# Patient Record
Sex: FEMALE | Race: WHITE | NOT HISPANIC OR LATINO | Employment: UNEMPLOYED | URBAN - NONMETROPOLITAN AREA
[De-identification: names, ages, dates, MRNs, and addresses within clinical notes are randomized per-mention and may not be internally consistent; named-entity substitution may affect disease eponyms.]

---

## 2017-06-06 ENCOUNTER — TRANSCRIBE ORDERS (OUTPATIENT)
Dept: ULTRASOUND IMAGING | Facility: HOSPITAL | Age: 47
End: 2017-06-06

## 2017-06-06 ENCOUNTER — TRANSCRIBE ORDERS (OUTPATIENT)
Dept: MAMMOGRAPHY | Facility: HOSPITAL | Age: 47
End: 2017-06-06

## 2017-06-06 ENCOUNTER — TRANSCRIBE ORDERS (OUTPATIENT)
Dept: MRI IMAGING | Facility: HOSPITAL | Age: 47
End: 2017-06-06

## 2017-06-06 DIAGNOSIS — M79.601 RIGHT ARM PAIN: Primary | ICD-10-CM

## 2017-06-06 DIAGNOSIS — M50.30 DEGENERATION OF CERVICAL INTERVERTEBRAL DISC: Primary | ICD-10-CM

## 2017-06-06 DIAGNOSIS — Z13.9 SCREENING: Primary | ICD-10-CM

## 2017-06-07 ENCOUNTER — HOSPITAL ENCOUNTER (OUTPATIENT)
Dept: GENERAL RADIOLOGY | Facility: HOSPITAL | Age: 47
Discharge: HOME | End: 2017-06-07

## 2017-06-07 ENCOUNTER — HOSPITAL ENCOUNTER (OUTPATIENT)
Dept: GENERAL RADIOLOGY | Facility: HOSPITAL | Age: 47
Discharge: HOME | End: 2017-06-07
Admitting: FAMILY MEDICINE

## 2017-06-07 ENCOUNTER — TRANSCRIBE ORDERS (OUTPATIENT)
Dept: ADMINISTRATIVE | Facility: HOSPITAL | Age: 47
End: 2017-06-07

## 2017-06-07 DIAGNOSIS — M54.2 NECK PAIN: Primary | ICD-10-CM

## 2017-06-07 DIAGNOSIS — M25.521 RIGHT ELBOW PAIN: ICD-10-CM

## 2017-06-07 PROCEDURE — 72050 X-RAY EXAM NECK SPINE 4/5VWS: CPT

## 2017-06-07 PROCEDURE — 73080 X-RAY EXAM OF ELBOW: CPT

## 2017-07-28 ENCOUNTER — TRANSCRIBE ORDERS (OUTPATIENT)
Dept: MAMMOGRAPHY | Facility: HOSPITAL | Age: 47
End: 2017-07-28

## 2017-08-24 ENCOUNTER — TRANSCRIBE ORDERS (OUTPATIENT)
Dept: MAMMOGRAPHY | Facility: HOSPITAL | Age: 47
End: 2017-08-24

## 2017-08-24 DIAGNOSIS — Z12.31 SCREENING MAMMOGRAM, ENCOUNTER FOR: Primary | ICD-10-CM

## 2017-10-04 ENCOUNTER — CONSULT (OUTPATIENT)
Dept: ONCOLOGY | Facility: CLINIC | Age: 47
End: 2017-10-04

## 2017-10-04 VITALS
DIASTOLIC BLOOD PRESSURE: 100 MMHG | WEIGHT: 293 LBS | BODY MASS INDEX: 43.4 KG/M2 | OXYGEN SATURATION: 98 % | HEIGHT: 69 IN | HEART RATE: 94 BPM | TEMPERATURE: 97.2 F | SYSTOLIC BLOOD PRESSURE: 148 MMHG

## 2017-10-04 DIAGNOSIS — Z86.711 HX PULMONARY EMBOLISM: Primary | ICD-10-CM

## 2017-10-04 PROCEDURE — 99204 OFFICE O/P NEW MOD 45 MIN: CPT | Performed by: INTERNAL MEDICINE

## 2017-10-04 RX ORDER — LISINOPRIL AND HYDROCHLOROTHIAZIDE 25; 20 MG/1; MG/1
1 TABLET ORAL DAILY
COMMUNITY
End: 2019-05-22 | Stop reason: HOSPADM

## 2017-10-04 RX ORDER — VENLAFAXINE HYDROCHLORIDE 150 MG/1
150 CAPSULE, EXTENDED RELEASE ORAL DAILY
COMMUNITY
End: 2021-10-28

## 2017-10-04 RX ORDER — FLUTICASONE PROPIONATE 50 MCG
2 SPRAY, SUSPENSION (ML) NASAL DAILY
COMMUNITY

## 2017-10-04 NOTE — PROGRESS NOTES
DATE OF CONSULTATION: 10/4/2017    REFERRING PHYSICIAN: Fortunato Hunter DO    Dear Dr. Fortunato Hunter DO  Thank you for asking for my medical advice on this patient. I saw her in the  [place] office on 10/4/2017    REASON FOR CONSULTATION: History of bilateral massive pulmonary embolisms     HISTORY OF PRESENT ILLNESS: The patient is a very pleasant 47 y.o.  female   With past medical history significant for metastatic bilateral massive PEs as well as right below knee DVT diagnosed June 2014.  Patient blood clots were unprovoked.  She was treated with thrombolysis followed by 1 year full anticoagulation.  Patient had changed her primary care provider.  She was referred to me for further recommendations.    SUBJECTIVE: When I saw the patient today she's been very anxious about her new diagnosis of lung metastatic breast cancer.  Patient herself had history of uterine cancer treated with hysterectomy as well as profound.  She were treated with resection.  Patient denied any bleeding denied any skin bruises.  She has been anxious about having your blood clots.    Review of Systems   Constitutional: Negative for activity change, appetite change, chills, fatigue, fever and unexpected weight change.   HENT: Negative for hearing loss, mouth sores, nosebleeds, sore throat and trouble swallowing.    Eyes: Negative for visual disturbance.   Respiratory: Negative for cough, chest tightness, shortness of breath and wheezing.    Cardiovascular: Negative for chest pain, palpitations and leg swelling.   Gastrointestinal: Negative for abdominal distention, abdominal pain, blood in stool, constipation, diarrhea, nausea, rectal pain and vomiting.   Endocrine: Negative for cold intolerance and heat intolerance.   Genitourinary: Negative for difficulty urinating, dysuria, frequency and urgency.   Musculoskeletal: Negative for arthralgias, back pain, gait problem, joint swelling and myalgias.   Skin: Negative for  rash.   Neurological: Negative for dizziness, tremors, syncope, weakness, light-headedness, numbness and headaches.   Hematological: Negative for adenopathy. Does not bruise/bleed easily.   Psychiatric/Behavioral: Negative for confusion, sleep disturbance and suicidal ideas. The patient is not nervous/anxious.        Past Medical History:   Diagnosis Date   • Anxiety    • Depression    • DVT (deep vein thrombosis) in pregnancy    • HTN (hypertension)    • Parathyroid cancer    • Stroke    • Uterine cancer        Social History     Social History   • Marital status:      Spouse name: N/A   • Number of children: N/A   • Years of education: N/A     Occupational History   • Not on file.     Social History Main Topics   • Smoking status: Former Smoker   • Smokeless tobacco: Never Used   • Alcohol use Yes   • Drug use: No   • Sexual activity: Not on file     Other Topics Concern   • Not on file     Social History Narrative   • No narrative on file       No family history on file.    Past Surgical History:   Procedure Laterality Date   •  SECTION     • PARATHYROIDECTOMY         Allergies not on file     No current outpatient prescriptions on file.    PHYSICAL EXAMINATION:   There were no vitals taken for this visit.   ECOG Performance Status: 0 - Asymptomatic  General Appearance:  alert, cooperative, no apparent distress and appears stated age   Neurologic/Psychiatric: A&O x 3, gait steady, appropriate affect, strength 5/5 in all muscle groups   HEENT:  Normocephalic, without obvious abnormality, mucous membranes moist   Neck: Supple, symmetrical, trachea midline, no adenopathy;  No thyromegaly, masses, or tenderness   Lungs:   Clear to auscultation bilaterally; respirations regular, even, and unlabored bilaterally   Heart:  Regular rate and rhythm, no murmurs appreciated   Abdomen:   Soft, non-tender, non-distended and no organomegaly   Lymph nodes: No cervical, supraclavicular, inguinal or axillary  adenopathy noted   Extremities: Normal, atraumatic; no clubbing, cyanosis, or edema    Skin: No rashes, ulcers, or suspicious lesions noted       No visits with results within 2 Week(s) from this visit.  Latest known visit with results is:    Hospital Outpatient Visit on 06/20/2014   Component Date Value Ref Range Status   • Antinuclear Antibody, IFA 06/20/2014 Negative  . Final    Comment:                                      Negative   <1:80                                         Borderline  1:80                                         Positive   >1:80    Performed at:  [address]  [id number]    : Govind Jonas MD, Phone:  [phone]       • Factor II, DNA Analysis 06/20/2014 Comment  . Final    Comment: NEGATIVE    No mutation identified.    Comment:    A point mutation ([id number]) in the factor II (prothrombin)    gene is the second most common cause of inherited    thrombophilia. The incidence of this mutation in the [place]     population is about 2% and in the     American population it is approximately 0.5%. This mutation    is rare in the  and  population. Being    heterozygous for a prothrombin mutation increases the risk    for developing venous thrombosis about 2 to 3 times above    the general population risk. Being homozygous for the    prothrombin gene mutation increases the relative risk for    venous thrombosis further, although it is not yet known how    much further the risk is increased. In women heterozygous    for the prothrombin gene mutation, the use of estrogen    containing oral contraceptives increases the relative risk    of venous thrombosis about 16 times and the risk of    developing cerebral thrombosis is also significantly                               increased. In pregnancy the prothrombin gene mutation    increases risk for venous thrombosis and may increase    risk for stillbirth, placental  abruption, pre-eclampsia and    fetal growth restriction. If the patient possesses two or    more congenital or acquired thrombophilic risk factors, the    risk for thrombosis may rise to more than the sum of the    risk ratios for the individual mutations. This assay    detects only the prothrombin U06388J mutation and does    not measure genetic abnormalities elsewhere in the    genome. Other thrombotic risk factors may be pursued    through systematic clinical laboratory analysis. These    factors include the R506Q (Leiden) mutation in the Factor V    gene, plasma homocysteine levels, as well as testing for    deficiencies of antithrombin III, protein C and protein S.       • Additional Information 06/20/2014 Comment  . Final    Comment: Genetic Counselors are available for health care providers    to discuss results at [phone]    Methodology:    DNA analysis of the Factor II gene was performed by PCR    amplification followed by restriction analysis. The    diagnostic sensitivity is >99% for both. All the tests must    be combined with clinical information for the most accurate    interpretation. Molecular-based testing is highly accurate,    but as in any laboratory test, diagnostic errors may occur.    Poort SR, et al. Blood. 1996; 88:7995-7313.    Hattie EA. Circulation. 2004; 110:e15-e18.    Karl I, et al. Arterioscler Thromb Vasc Biol. 1999;    19:700-703.    Erick Martell, PhD    Sherrie Gusman, PhD    Aleida Frazier, PhD    Corin Mao, PhD    Kathy Meadows, PhD    MD Pita Souza MD, PhD    BRANDI Rodriguez, PhD    Performed at:  [facility]    [address] [place], [place]  [id number]    : Maria T Jackson MD, Phone:  [phone]                                  • Factor V Leiden 06/20/2014 Comment  . Final    Comment: Result:  Negative (no mutation found)    Factor V Leiden is a specific mutation (R506Q) in the factor    V  gene that is associated with an increased risk of venous    thrombosis. Factor V Leiden is more resistant to    inactivation by activated protein C.  As a result, factor V    persists in the circulation leading to a mild hyper-    coagulable state.  The Leiden mutation accounts for 90% -    95% of APC resistance.  Factor V Leiden has been reported in    patients with deep vein thrombosis, pulmonary embolus,    central retinal vein occlusion, cerebral sinus thrombosis    and hepatic vein thrombosis. Other risk factors to be    considered in the workup for venous thrombosis include the    [id number] mutation in the factor II (prothrombin) gene,    protein S and C deficiency, and antithrombin deficiencies.    Anticardiolipin antibody and lupus anticoagulant analysis    may be appropriate for certain patients, as well as    homocysteine levels.    Contact your local [facility] for informati                           on on how to order    additional testing if desired.       • C-Reactive Protein 06/20/2014 2.4* 0.0 - 1.0 mg/dL Final   • Dilute Prothrombin Time(dPT) 06/20/2014 74.0* 0.0 - 55.0 sec Final   • dPT Confirm Ratio 06/20/2014 1.06  0.00 - 1.20 Ratio Final   • Thrombin Time 06/20/2014 14.7  0.0 - 20.0 sec Final   • PTT Lupus Anticoagulant 06/20/2014 59.6* 0.0 - 50.0 sec Final   • PTT LA MIX 06/20/2014 38.4  0.0 - 50.0 sec Final   • Incubated PTT LA Mix 06/20/2014 40.7  0.0 - 50.0 sec Final   • Dilute Viper Venom Time 06/20/2014 47.9  0.0 - 55.1 sec Final   • Interpretation 06/20/2014 Comment:  . Final    Comment: The dPT is markedly extended, as can be seen in patients    receiving oral anticoagulant therapy (OAT). In general, OAT can    reduce the accuracy of lupus anticoagulant testing and results    obtained for patients recieving OAT should be interpreted with    caution.  No lupus anticoagulant was detected. PTT-LA results are    consistent with a deficiency of one or more intrinsic pathway    factors.  Since the  dRVVT was within normal limits, the factors in    question are VIII, IX, XI, XII and the contact factors.  It should be    noted that mixing studies performed on samples with minimally    extended PTT-LA results can be equivocal.  Normal plasma can    overcome weak inhibitors, also resulting in a correction of the    mixing study. The dPT was extended but the dPT confirmatory ratio    was normal. This is consistent with a deficiency or specific    inhibition of one or more extrinsic pathway factors (VII, X, V, II or    fibrinogen).    Performed at:  [address]  [id number]    : Erasmo Yee MD, Phone:  [phone]            No results found.      DIAGNOSTIC DATA:     1. Radiology:    PROCEDURE: XR ELBOW 3+ VW RIGHT-      History: M25.521; M25.521-Pain in right elbow      COMPARISON: None.      FINDINGS:  A 3 view exam demonstrates no acute fracture or dislocation.  The joint spaces are preserved. No soft tissue abnormality is seen.          IMPRESSION:  No acute fracture.     2. Dr. Moeller's note reviewed by me and documented in the  chart.     3. Pathology report: None    4. Laboratory data: As above    ASSESSMENT: The patient is a very pleasant 47 y.o.  female  with history of PE    PROBLEM LIST:   1.  History of unprovoked bilateral massive PEs with right below knee DVT June 2014  2.  Treated with thrombolysis followed by one year of anticoagulation initially with Coumadin followed by Eliquis.  3.  History of uterine cancer treated with hysterectomy  4.  History of parathyroid tumor treated with resection  5.  Morbid obesity  6.  Family history of breast cancer     PLAN:   1. I had a long discussion today with the patient and her  about her diagnosis of PEs.  I reviewed the patient's documents including refereing provider's notes, lab results, and imaging report.   2.  I explained to the patient since she had  bilateral massive unprovoked PEs she would benefit from lifelong anticoagulation despite the negative thrombophilia workup.  I truly believe the benefit of preventing another massive blood clot is higher than the lower risk of bleeding.  3.  Since patient has been treated adequately with full anticoagulation, at this point I will start her on Eliquis 2.5 mg by mouth twice a day.  Patient will stay on this lifelong.  4.  I would recommend to hold Eliquis 2 days prior surgical procedure and resume it as soon as she is been cleared by the surgeon.  5.  Patient was advised to exercise and lose weight. I explained to the patient that obesity is a major risk factor for venous thrombotic events.  6.  I emphasized the importance of keeping up with annual mammograms given her family history of breast cancer.  7.  The patient was see me in the future on an as-needed basis.  Zhanna Lyle MD  10/4/2017

## 2018-05-04 ENCOUNTER — TRANSCRIBE ORDERS (OUTPATIENT)
Dept: ADMINISTRATIVE | Facility: HOSPITAL | Age: 48
End: 2018-05-04

## 2018-05-04 DIAGNOSIS — M50.20 PROLAPSED CERVICAL INTERVERTEBRAL DISC: Primary | ICD-10-CM

## 2018-06-19 RX ORDER — APIXABAN 2.5 MG/1
TABLET, FILM COATED ORAL
Qty: 60 TABLET | Refills: 0 | OUTPATIENT
Start: 2018-06-19

## 2019-04-01 ENCOUNTER — TRANSCRIBE ORDERS (OUTPATIENT)
Dept: ADMINISTRATIVE | Facility: HOSPITAL | Age: 49
End: 2019-04-01

## 2019-04-01 DIAGNOSIS — Z12.39 SCREENING BREAST EXAMINATION: Primary | ICD-10-CM

## 2019-04-10 ENCOUNTER — TRANSCRIBE ORDERS (OUTPATIENT)
Dept: ADMINISTRATIVE | Facility: HOSPITAL | Age: 49
End: 2019-04-10

## 2019-04-10 DIAGNOSIS — R51.9 NONINTRACTABLE HEADACHE, UNSPECIFIED CHRONICITY PATTERN, UNSPECIFIED HEADACHE TYPE: Primary | ICD-10-CM

## 2019-04-12 ENCOUNTER — HOSPITAL ENCOUNTER (OUTPATIENT)
Dept: GENERAL RADIOLOGY | Facility: HOSPITAL | Age: 49
Discharge: HOME | End: 2019-04-12

## 2019-04-12 ENCOUNTER — TRANSCRIBE ORDERS (OUTPATIENT)
Dept: GENERAL RADIOLOGY | Facility: HOSPITAL | Age: 49
End: 2019-04-12

## 2019-04-12 ENCOUNTER — HOSPITAL ENCOUNTER (OUTPATIENT)
Dept: GENERAL RADIOLOGY | Facility: HOSPITAL | Age: 49
Discharge: HOME | End: 2019-04-12
Admitting: FAMILY MEDICINE

## 2019-04-12 DIAGNOSIS — M25.512 BILATERAL SHOULDER PAIN, UNSPECIFIED CHRONICITY: ICD-10-CM

## 2019-04-12 DIAGNOSIS — G89.29 BILATERAL CHRONIC KNEE PAIN: ICD-10-CM

## 2019-04-12 DIAGNOSIS — M79.642 BILATERAL HAND PAIN: ICD-10-CM

## 2019-04-12 DIAGNOSIS — M25.561 BILATERAL CHRONIC KNEE PAIN: ICD-10-CM

## 2019-04-12 DIAGNOSIS — M54.6 THORACIC BACK PAIN, UNSPECIFIED BACK PAIN LATERALITY, UNSPECIFIED CHRONICITY: ICD-10-CM

## 2019-04-12 DIAGNOSIS — M25.551 BILATERAL HIP PAIN: ICD-10-CM

## 2019-04-12 DIAGNOSIS — M54.6 THORACIC SPINE PAIN: ICD-10-CM

## 2019-04-12 DIAGNOSIS — M25.562 BILATERAL CHRONIC KNEE PAIN: ICD-10-CM

## 2019-04-12 DIAGNOSIS — M54.50 LOW BACK PAIN WITHOUT SCIATICA, UNSPECIFIED BACK PAIN LATERALITY, UNSPECIFIED CHRONICITY: ICD-10-CM

## 2019-04-12 DIAGNOSIS — M25.571 BILATERAL ANKLE JOINT PAIN: ICD-10-CM

## 2019-04-12 DIAGNOSIS — M25.572 BILATERAL ANKLE JOINT PAIN: ICD-10-CM

## 2019-04-12 DIAGNOSIS — M25.552 BILATERAL HIP PAIN: ICD-10-CM

## 2019-04-12 DIAGNOSIS — M79.641 BILATERAL HAND PAIN: ICD-10-CM

## 2019-04-12 DIAGNOSIS — M25.511 BILATERAL SHOULDER PAIN, UNSPECIFIED CHRONICITY: ICD-10-CM

## 2019-04-12 DIAGNOSIS — M25.511 BILATERAL SHOULDER PAIN, UNSPECIFIED CHRONICITY: Primary | ICD-10-CM

## 2019-04-12 DIAGNOSIS — M25.512 BILATERAL SHOULDER PAIN, UNSPECIFIED CHRONICITY: Primary | ICD-10-CM

## 2019-04-12 PROCEDURE — 72110 X-RAY EXAM L-2 SPINE 4/>VWS: CPT

## 2019-04-12 PROCEDURE — 72072 X-RAY EXAM THORAC SPINE 3VWS: CPT

## 2019-04-12 PROCEDURE — 71046 X-RAY EXAM CHEST 2 VIEWS: CPT

## 2019-04-12 PROCEDURE — 73130 X-RAY EXAM OF HAND: CPT

## 2019-04-12 PROCEDURE — 73030 X-RAY EXAM OF SHOULDER: CPT

## 2019-04-12 PROCEDURE — 73522 X-RAY EXAM HIPS BI 3-4 VIEWS: CPT

## 2019-04-12 PROCEDURE — 73562 X-RAY EXAM OF KNEE 3: CPT

## 2019-04-12 PROCEDURE — 73610 X-RAY EXAM OF ANKLE: CPT

## 2019-04-18 ENCOUNTER — HOSPITAL ENCOUNTER (OUTPATIENT)
Dept: MRI IMAGING | Facility: HOSPITAL | Age: 49
End: 2019-04-18

## 2019-04-18 ENCOUNTER — APPOINTMENT (OUTPATIENT)
Dept: MRI IMAGING | Facility: HOSPITAL | Age: 49
End: 2019-04-18

## 2019-05-22 ENCOUNTER — OFFICE VISIT (OUTPATIENT)
Dept: ONCOLOGY | Facility: CLINIC | Age: 49
End: 2019-05-22

## 2019-05-22 VITALS
RESPIRATION RATE: 22 BRPM | HEART RATE: 120 BPM | HEIGHT: 69 IN | DIASTOLIC BLOOD PRESSURE: 92 MMHG | WEIGHT: 293 LBS | SYSTOLIC BLOOD PRESSURE: 155 MMHG | TEMPERATURE: 97.5 F | BODY MASS INDEX: 43.4 KG/M2

## 2019-05-22 DIAGNOSIS — R77.9 ABNORMALITY OF PLASMA PROTEIN: Primary | ICD-10-CM

## 2019-05-22 PROCEDURE — 99214 OFFICE O/P EST MOD 30 MIN: CPT | Performed by: INTERNAL MEDICINE

## 2019-05-22 RX ORDER — GABAPENTIN 300 MG/1
CAPSULE ORAL
COMMUNITY
Start: 2019-05-15 | End: 2022-04-12

## 2019-05-22 RX ORDER — PREDNISONE 10 MG/1
TABLET ORAL
COMMUNITY
Start: 2019-05-15 | End: 2021-10-28

## 2019-05-22 RX ORDER — VENLAFAXINE HYDROCHLORIDE 75 MG/1
CAPSULE, EXTENDED RELEASE ORAL
COMMUNITY
Start: 2019-05-15 | End: 2019-05-22 | Stop reason: HOSPADM

## 2019-05-22 RX ORDER — LISINOPRIL 10 MG/1
TABLET ORAL
COMMUNITY
Start: 2019-05-03

## 2019-05-22 RX ORDER — VALACYCLOVIR HYDROCHLORIDE 1 G/1
TABLET, FILM COATED ORAL
COMMUNITY
Start: 2019-05-15 | End: 2021-10-28

## 2019-05-22 RX ORDER — FOLIC ACID 1 MG/1
TABLET ORAL
COMMUNITY
Start: 2019-05-15 | End: 2022-04-12

## 2019-05-22 RX ORDER — ARIPIPRAZOLE 5 MG/1
TABLET ORAL
COMMUNITY
Start: 2019-05-15

## 2019-05-22 RX ORDER — AMLODIPINE BESYLATE 10 MG/1
TABLET ORAL
COMMUNITY
Start: 2019-05-15 | End: 2022-04-12

## 2019-05-22 NOTE — PROGRESS NOTES
DATE OF VISIT: 5/22/2019    REASON FOR VISIT: Followup for bilateral pulmonary embolisms     HISTORY OF PRESENT ILLNESS: The patient is a very pleasant 48 y.o. female  with past medical history significant for bilateral PEs. The patient is here today for new complaint.    SUBJECTIVE: The patient is here today with her .  She was recently referred by her primary care provider secondary to abnormal SPEP.  She has been very anxious since she got the news. she denied any fever or chills, no night sweats, denied any headaches    PAST MEDICAL HISTORY/SOCIAL HISTORY/FAMILY HISTORY: Reviewed by me and unchanged from my documentation done on 05/22/19.    Review of Systems   Constitutional: Negative for activity change, appetite change, chills, fatigue, fever and unexpected weight change.   HENT: Negative for hearing loss, mouth sores, nosebleeds, sore throat and trouble swallowing.    Eyes: Negative for visual disturbance.   Respiratory: Negative for cough, chest tightness, shortness of breath and wheezing.    Cardiovascular: Negative for chest pain, palpitations and leg swelling.   Gastrointestinal: Negative for abdominal distention, abdominal pain, blood in stool, constipation, diarrhea, nausea, rectal pain and vomiting.   Endocrine: Negative for cold intolerance and heat intolerance.   Genitourinary: Negative for difficulty urinating, dysuria, frequency and urgency.   Musculoskeletal: Negative for arthralgias, back pain, gait problem, joint swelling and myalgias.   Skin: Negative for rash.   Neurological: Negative for dizziness, tremors, syncope, weakness, light-headedness, numbness and headaches.   Hematological: Negative for adenopathy. Does not bruise/bleed easily.   Psychiatric/Behavioral: Negative for confusion, sleep disturbance and suicidal ideas. The patient is not nervous/anxious.          Current Outpatient Medications:   •  amLODIPine (NORVASC) 10 MG tablet, , Disp: , Rfl:   •  ARIPiprazole (ABILIFY) 5  "MG tablet, , Disp: , Rfl:   •  ELIQUIS 2.5 MG tablet tablet, TAKE ONE TABLET BY MOUTH 2 TIMES A DAY SUBSEQUENT REFILLS WILL BE FILLED BY PCP, Disp: 60 tablet, Rfl: 0  •  fluticasone (FLONASE) 50 MCG/ACT nasal spray, 2 sprays into each nostril Daily., Disp: , Rfl:   •  folic acid (FOLVITE) 1 MG tablet, , Disp: , Rfl:   •  gabapentin (NEURONTIN) 300 MG capsule, , Disp: , Rfl:   •  lisinopril (PRINIVIL,ZESTRIL) 10 MG tablet, , Disp: , Rfl:   •  methotrexate 2.5 MG tablet, , Disp: , Rfl:   •  predniSONE (DELTASONE) 10 MG tablet, , Disp: , Rfl:   •  valACYclovir (VALTREX) 1000 MG tablet, , Disp: , Rfl:   •  venlafaxine XR (EFFEXOR-XR) 150 MG 24 hr capsule, Take 150 mg by mouth Daily., Disp: , Rfl:     PHYSICAL EXAMINATION:   /92   Pulse 120   Temp 97.5 °F (36.4 °C) (Temporal)   Resp 22   Ht 175.3 cm (69\")   Wt (!) 161 kg (355 lb)   BMI 52.42 kg/m²    ECOG Performance Status: 1 - Symptomatic but completely ambulatory  General Appearance:  alert, cooperative, no apparent distress and appears stated age   Neurologic/Psychiatric: A&O x 3, gait steady, appropriate affect, strength 5/5 in all muscle groups   HEENT:  Normocephalic, without obvious abnormality, mucous membranes moist   Neck: Supple, symmetrical, trachea midline, no adenopathy;  No thyromegaly, masses, or tenderness   Lungs:   Clear to auscultation bilaterally; respirations regular, even, and unlabored bilaterally   Heart:  Regular rate and rhythm, no murmurs appreciated   Abdomen:   Soft, non-tender, non-distended and no organomegaly   Lymph nodes: No cervical, supraclavicular, inguinal or axillary adenopathy noted   Extremities: Normal, atraumatic; no clubbing, cyanosis, or edema    Skin: No rashes, ulcers, or suspicious lesions noted     No visits with results within 2 Week(s) from this visit.   Latest known visit with results is:   Hospital Outpatient Visit on 06/20/2014   Component Date Value Ref Range Status   • Antinuclear Antibody, IFA " 06/20/2014 Negative  . Final    Comment:                                      Negative   <1:80                                         Borderline  1:80                                         Positive   >1:80    Performed at:  [address]  [id number]    : Govind Jonas MD, Phone:  [phone]       • Factor II, DNA Analysis 06/20/2014 Comment  . Final    Comment: NEGATIVE    No mutation identified.    Comment:    A point mutation ([id number]) in the factor II (prothrombin)    gene is the second most common cause of inherited    thrombophilia. The incidence of this mutation in the [place]     population is about 2% and in the     American population it is approximately 0.5%. This mutation    is rare in the  and  population. Being    heterozygous for a prothrombin mutation increases the risk    for developing venous thrombosis about 2 to 3 times above    the general population risk. Being homozygous for the    prothrombin gene mutation increases the relative risk for    venous thrombosis further, although it is not yet known how    much further the risk is increased. In women heterozygous    for the prothrombin gene mutation, the use of estrogen    containing oral contraceptives increases the relative risk    of venous thrombosis about 16 times and the risk of    developing cerebral thrombosis is also significantly                               increased. In pregnancy the prothrombin gene mutation    increases risk for venous thrombosis and may increase    risk for stillbirth, placental abruption, pre-eclampsia and    fetal growth restriction. If the patient possesses two or    more congenital or acquired thrombophilic risk factors, the    risk for thrombosis may rise to more than the sum of the    risk ratios for the individual mutations. This assay    detects only the prothrombin X00406S mutation and does    not measure genetic  abnormalities elsewhere in the    genome. Other thrombotic risk factors may be pursued    through systematic clinical laboratory analysis. These    factors include the R506Q (Leiden) mutation in the Factor V    gene, plasma homocysteine levels, as well as testing for    deficiencies of antithrombin III, protein C and protein S.       • Additional Information 06/20/2014 Comment  . Final    Comment: Genetic Counselors are available for health care providers    to discuss results at [phone] [place] [phone]    Methodology:    DNA analysis of the Factor II gene was performed by PCR    amplification followed by restriction analysis. The    diagnostic sensitivity is >99% for both. All the tests must    be combined with clinical information for the most accurate    interpretation. Molecular-based testing is highly accurate,    but as in any laboratory test, diagnostic errors may occur.    Poort SR, et al. Blood. 1996; 88:2329-6615.    Hattie GANN. Circulation. 2004; 110:e15-e18.    Karl I, et al. Arterioscler Thromb Vasc Biol. 1999;    19:700-703.    Erick Martell, PhD    Sherrie Gusman, PhD    Aleida Frazier, PhD    Corin Mao, PhD    Kathy Meadows, PhD    MD Pita Souza MD, PhD    S BRANDI Campos, PhD    Performed at:  [facility]    [address] [place], [place]  [id number]    : Maria T Jackson MD, Phone:  [phone]                                  • Factor V Leiden 06/20/2014 Comment  . Final    Comment: Result:  Negative (no mutation found)    Factor V Leiden is a specific mutation (R506Q) in the factor    V gene that is associated with an increased risk of venous    thrombosis. Factor V Leiden is more resistant to    inactivation by activated protein C.  As a result, factor V    persists in the circulation leading to a mild hyper-    coagulable state.  The Leiden mutation accounts for 90% -    95% of APC resistance.  Factor V Leiden has been reported  in    patients with deep vein thrombosis, pulmonary embolus,    central retinal vein occlusion, cerebral sinus thrombosis    and hepatic vein thrombosis. Other risk factors to be    considered in the workup for venous thrombosis include the    [id number] mutation in the factor II (prothrombin) gene,    protein S and C deficiency, and antithrombin deficiencies.    Anticardiolipin antibody and lupus anticoagulant analysis    may be appropriate for certain patients, as well as    homocysteine levels.    Contact your local [facility] for informati                           on on how to order    additional testing if desired.       • C-Reactive Protein 06/20/2014 2.4* 0.0 - 1.0 mg/dL Final   • Dilute Prothrombin Time(dPT) 06/20/2014 74.0* 0.0 - 55.0 sec Final   • dPT Confirm Ratio 06/20/2014 1.06  0.00 - 1.20 Ratio Final   • Thrombin Time 06/20/2014 14.7  0.0 - 20.0 sec Final   • PTT Lupus Anticoagulant 06/20/2014 59.6* 0.0 - 50.0 sec Final   • PTT LA MIX 06/20/2014 38.4  0.0 - 50.0 sec Final   • Incubated PTT LA Mix 06/20/2014 40.7  0.0 - 50.0 sec Final   • Dilute Viper Venom Time 06/20/2014 47.9  0.0 - 55.1 sec Final   • Interpretation 06/20/2014 Comment:  . Final    Comment: The dPT is markedly extended, as can be seen in patients    receiving oral anticoagulant therapy (OAT). In general, OAT can    reduce the accuracy of lupus anticoagulant testing and results    obtained for patients recieving OAT should be interpreted with    caution.  No lupus anticoagulant was detected. PTT-LA results are    consistent with a deficiency of one or more intrinsic pathway    factors.  Since the dRVVT was within normal limits, the factors in    question are VIII, IX, XI, XII and the contact factors.  It should be    noted that mixing studies performed on samples with minimally    extended PTT-LA results can be equivocal.  Normal plasma can    overcome weak inhibitors, also resulting in a correction of the    mixing study. The dPT was  extended but the dPT confirmatory ratio    was normal. This is consistent with a deficiency or specific    inhibition of one or more extrinsic pathway factors (VII, X, V, II or    fibrinogen).    Performed at:  [address]  [id number]    : Erasmo Yee MD, Phone:  [phone]            No results found.    ASSESSMENT: The patient is a very pleasant 48 y.o. female  with history of PE    PROBLEM LIST:   1.  History of unprovoked bilateral massive PEs with right below knee DVT June 2014  2.  Treated with thrombolysis followed by one year of anticoagulation initially with Coumadin followed by Eliquis.  3.  History of uterine cancer treated with hysterectomy  4.  History of parathyroid tumor treated with resection  5.  Morbid obesity  6.  Family history of breast cancer   7.  Rheumatoid arthritis    PLAN:  1.  I did go over the blood work results with the patient.  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  The patient was recently diagnosed with rheumatoid arthritis and she is currently on methotrexate treatment.  2.  The patient will follow-up with me in 6 months with repeat serum protein pheresis per  3.  The patient will continue matrixes with folic acid patient going to follow-up with Dr. Scott from rheumatology.  4 we will continue Eliquis 2.5 mg twice daily indefinitely.      Zhanna Lyle MD  5/22/2019

## 2019-06-04 ENCOUNTER — TRANSCRIBE ORDERS (OUTPATIENT)
Dept: ADMINISTRATIVE | Facility: HOSPITAL | Age: 49
End: 2019-06-04

## 2019-06-04 DIAGNOSIS — Z12.39 ENCOUNTER FOR SCREENING FOR MALIGNANT NEOPLASM OF BREAST: Primary | ICD-10-CM

## 2019-07-08 ENCOUNTER — TRANSCRIBE ORDERS (OUTPATIENT)
Dept: ADMINISTRATIVE | Facility: HOSPITAL | Age: 49
End: 2019-07-08

## 2019-07-08 DIAGNOSIS — M79.89 SWELLING OF LIMB: Primary | ICD-10-CM

## 2019-07-08 DIAGNOSIS — R60.9 EDEMA, UNSPECIFIED TYPE: ICD-10-CM

## 2019-07-16 ENCOUNTER — TRANSCRIBE ORDERS (OUTPATIENT)
Dept: ADMINISTRATIVE | Facility: HOSPITAL | Age: 49
End: 2019-07-16

## 2019-07-16 DIAGNOSIS — R60.9 EDEMA, UNSPECIFIED TYPE: Primary | ICD-10-CM

## 2019-08-01 ENCOUNTER — DOCUMENTATION (OUTPATIENT)
Dept: NUTRITION | Facility: HOSPITAL | Age: 49
End: 2019-08-01

## 2019-08-01 NOTE — PROGRESS NOTES
Nutrition Services    Patient Name:  Jacquelyn Goldberg  YOB: 1970  MRN: [id number]  Admit Date:  (Not on file)    I have been unable to reach this patient by phone. A letter is being sent to the last known home address.      Electronically signed by:  Eden Chaves RD  08/01/19 3:52 PM

## 2019-09-10 ENCOUNTER — DOCUMENTATION (OUTPATIENT)
Dept: NUTRITION | Facility: HOSPITAL | Age: 49
End: 2019-09-10

## 2019-09-10 NOTE — PROGRESS NOTES
Nutrition Services    Patient Name:  Jacquelyn Goldberg  YOB: 1970  MRN: [id number]  Admit Date:  (Not on file)    RD sent follow up letter on 8/1/2019 with no response from pt. ELISA to close out chart and notify referring provider at this time. I sincerely would like to thank you for this consult.      Electronically signed by:  Eden Chaves RD  09/10/19 10:37 AM

## 2019-11-18 RX ORDER — HYDROCHLOROTHIAZIDE 12.5 MG/1
CAPSULE, GELATIN COATED ORAL DAILY
COMMUNITY
Start: 2014-08-05

## 2019-11-18 RX ORDER — FLUOXETINE HYDROCHLORIDE 40 MG/1
CAPSULE ORAL DAILY
COMMUNITY
Start: 2014-06-13 | End: 2019-11-18

## 2019-11-18 RX ORDER — ATORVASTATIN CALCIUM 20 MG/1
TABLET, FILM COATED ORAL
COMMUNITY
Start: 2014-07-17 | End: 2022-04-12

## 2019-11-18 RX ORDER — GABAPENTIN 800 MG/1
TABLET ORAL
COMMUNITY
Start: 2019-09-06 | End: 2022-04-12

## 2020-11-05 ENCOUNTER — TRANSCRIBE ORDERS (OUTPATIENT)
Dept: ADMINISTRATIVE | Facility: HOSPITAL | Age: 50
End: 2020-11-05

## 2020-11-05 DIAGNOSIS — Z12.31 ENCOUNTER FOR SCREENING MAMMOGRAM FOR BREAST CANCER: Primary | ICD-10-CM

## 2021-06-10 ENCOUNTER — TRANSCRIBE ORDERS (OUTPATIENT)
Dept: ADMINISTRATIVE | Facility: HOSPITAL | Age: 51
End: 2021-06-10

## 2021-06-10 DIAGNOSIS — Z12.31 VISIT FOR SCREENING MAMMOGRAM: Primary | ICD-10-CM

## 2021-07-26 ENCOUNTER — TRANSCRIBE ORDERS (OUTPATIENT)
Dept: ADMINISTRATIVE | Facility: HOSPITAL | Age: 51
End: 2021-07-26

## 2021-07-26 DIAGNOSIS — Z12.39 BREAST SCREENING: Primary | ICD-10-CM

## 2021-10-25 ENCOUNTER — LAB (OUTPATIENT)
Dept: LAB | Facility: HOSPITAL | Age: 51
End: 2021-10-25

## 2021-10-25 ENCOUNTER — TRANSCRIBE ORDERS (OUTPATIENT)
Dept: LAB | Facility: HOSPITAL | Age: 51
End: 2021-10-25

## 2021-10-25 DIAGNOSIS — Z20.822 COVID-19 RULED OUT: Primary | ICD-10-CM

## 2021-10-25 DIAGNOSIS — Z20.822 COVID-19 RULED OUT: ICD-10-CM

## 2021-10-25 PROCEDURE — C9803 HOPD COVID-19 SPEC COLLECT: HCPCS

## 2021-10-25 PROCEDURE — U0004 COV-19 TEST NON-CDC HGH THRU: HCPCS

## 2021-10-26 ENCOUNTER — TELEPHONE (OUTPATIENT)
Dept: OTHER | Facility: HOSPITAL | Age: 51
End: 2021-10-26

## 2021-10-26 LAB — SARS-COV-2 RNA NOSE QL NAA+PROBE: NOT DETECTED

## 2021-10-28 ENCOUNTER — APPOINTMENT (OUTPATIENT)
Dept: CT IMAGING | Facility: HOSPITAL | Age: 51
End: 2021-10-28

## 2021-10-28 ENCOUNTER — HOSPITAL ENCOUNTER (EMERGENCY)
Facility: HOSPITAL | Age: 51
Discharge: HOME | End: 2021-10-28
Attending: EMERGENCY MEDICINE | Admitting: EMERGENCY MEDICINE

## 2021-10-28 ENCOUNTER — APPOINTMENT (OUTPATIENT)
Dept: GENERAL RADIOLOGY | Facility: HOSPITAL | Age: 51
End: 2021-10-28

## 2021-10-28 VITALS
HEIGHT: 69 IN | OXYGEN SATURATION: 96 % | TEMPERATURE: 98.3 F | HEART RATE: 97 BPM | BODY MASS INDEX: 43.4 KG/M2 | WEIGHT: 293 LBS | RESPIRATION RATE: 18 BRPM | SYSTOLIC BLOOD PRESSURE: 155 MMHG | DIASTOLIC BLOOD PRESSURE: 101 MMHG

## 2021-10-28 DIAGNOSIS — J06.9 UPPER RESPIRATORY INFECTION WITH COUGH AND CONGESTION: Primary | ICD-10-CM

## 2021-10-28 LAB
ALBUMIN SERPL-MCNC: 3.9 G/DL (ref 3.5–5.2)
ALBUMIN/GLOB SERPL: 1.1 G/DL
ALP SERPL-CCNC: 107 U/L (ref 39–117)
ALT SERPL W P-5'-P-CCNC: 18 U/L (ref 1–33)
ANION GAP SERPL CALCULATED.3IONS-SCNC: 8.1 MMOL/L (ref 5–15)
AST SERPL-CCNC: 23 U/L (ref 1–32)
B PARAPERT DNA SPEC QL NAA+PROBE: NOT DETECTED
B PERT DNA SPEC QL NAA+PROBE: NOT DETECTED
BASOPHILS # BLD AUTO: 0.04 10*3/MM3 (ref 0–0.2)
BASOPHILS NFR BLD AUTO: 0.4 % (ref 0–1.5)
BILIRUB SERPL-MCNC: 0.3 MG/DL (ref 0–1.2)
BUN SERPL-MCNC: 10 MG/DL (ref 6–20)
BUN/CREAT SERPL: 12.5 (ref 7–25)
C PNEUM DNA NPH QL NAA+NON-PROBE: NOT DETECTED
CALCIUM SPEC-SCNC: 9.5 MG/DL (ref 8.6–10.5)
CHLORIDE SERPL-SCNC: 98 MMOL/L (ref 98–107)
CO2 SERPL-SCNC: 32.9 MMOL/L (ref 22–29)
CREAT SERPL-MCNC: 0.8 MG/DL (ref 0.57–1)
DEPRECATED RDW RBC AUTO: 49.1 FL (ref 37–54)
EOSINOPHIL # BLD AUTO: 0.15 10*3/MM3 (ref 0–0.4)
EOSINOPHIL NFR BLD AUTO: 1.3 % (ref 0.3–6.2)
ERYTHROCYTE [DISTWIDTH] IN BLOOD BY AUTOMATED COUNT: 13.6 % (ref 12.3–15.4)
FLUAV SUBTYP SPEC NAA+PROBE: NOT DETECTED
FLUBV RNA ISLT QL NAA+PROBE: NOT DETECTED
GFR SERPL CREATININE-BSD FRML MDRD: 76 ML/MIN/1.73
GLOBULIN UR ELPH-MCNC: 3.6 GM/DL
GLUCOSE SERPL-MCNC: 162 MG/DL (ref 65–99)
HADV DNA SPEC NAA+PROBE: NOT DETECTED
HCOV 229E RNA SPEC QL NAA+PROBE: NOT DETECTED
HCOV HKU1 RNA SPEC QL NAA+PROBE: NOT DETECTED
HCOV NL63 RNA SPEC QL NAA+PROBE: NOT DETECTED
HCOV OC43 RNA SPEC QL NAA+PROBE: NOT DETECTED
HCT VFR BLD AUTO: 47.3 % (ref 34–46.6)
HGB BLD-MCNC: 15.4 G/DL (ref 12–15.9)
HMPV RNA NPH QL NAA+NON-PROBE: NOT DETECTED
HPIV1 RNA SPEC QL NAA+PROBE: NOT DETECTED
HPIV2 RNA SPEC QL NAA+PROBE: NOT DETECTED
HPIV3 RNA NPH QL NAA+PROBE: NOT DETECTED
HPIV4 P GENE NPH QL NAA+PROBE: NOT DETECTED
IMM GRANULOCYTES # BLD AUTO: 0.04 10*3/MM3 (ref 0–0.05)
IMM GRANULOCYTES NFR BLD AUTO: 0.4 % (ref 0–0.5)
LYMPHOCYTES # BLD AUTO: 1.52 10*3/MM3 (ref 0.7–3.1)
LYMPHOCYTES NFR BLD AUTO: 13.7 % (ref 19.6–45.3)
M PNEUMO IGG SER IA-ACNC: NOT DETECTED
MCH RBC QN AUTO: 31.8 PG (ref 26.6–33)
MCHC RBC AUTO-ENTMCNC: 32.6 G/DL (ref 31.5–35.7)
MCV RBC AUTO: 97.5 FL (ref 79–97)
MONOCYTES # BLD AUTO: 0.71 10*3/MM3 (ref 0.1–0.9)
MONOCYTES NFR BLD AUTO: 6.4 % (ref 5–12)
NEUTROPHILS NFR BLD AUTO: 77.8 % (ref 42.7–76)
NEUTROPHILS NFR BLD AUTO: 8.67 10*3/MM3 (ref 1.7–7)
NRBC BLD AUTO-RTO: 0 /100 WBC (ref 0–0.2)
NT-PROBNP SERPL-MCNC: 27.3 PG/ML (ref 0–900)
PLATELET # BLD AUTO: 223 10*3/MM3 (ref 140–450)
PMV BLD AUTO: 9.9 FL (ref 6–12)
POTASSIUM SERPL-SCNC: 4.5 MMOL/L (ref 3.5–5.2)
PROT SERPL-MCNC: 7.5 G/DL (ref 6–8.5)
RBC # BLD AUTO: 4.85 10*6/MM3 (ref 3.77–5.28)
RHINOVIRUS RNA SPEC NAA+PROBE: NOT DETECTED
RSV RNA NPH QL NAA+NON-PROBE: NOT DETECTED
SARS-COV-2 RNA NPH QL NAA+NON-PROBE: NOT DETECTED
SODIUM SERPL-SCNC: 139 MMOL/L (ref 136–145)
TROPONIN T SERPL-MCNC: <0.01 NG/ML (ref 0–0.03)
WBC # BLD AUTO: 11.13 10*3/MM3 (ref 3.4–10.8)

## 2021-10-28 PROCEDURE — 63710000001 DEXAMETHASONE PER 0.25 MG: Performed by: NURSE PRACTITIONER

## 2021-10-28 PROCEDURE — 25010000002 IOPAMIDOL 61 % SOLUTION: Performed by: EMERGENCY MEDICINE

## 2021-10-28 PROCEDURE — 83880 ASSAY OF NATRIURETIC PEPTIDE: CPT | Performed by: EMERGENCY MEDICINE

## 2021-10-28 PROCEDURE — 99283 EMERGENCY DEPT VISIT LOW MDM: CPT

## 2021-10-28 PROCEDURE — 71275 CT ANGIOGRAPHY CHEST: CPT

## 2021-10-28 PROCEDURE — 80053 COMPREHEN METABOLIC PANEL: CPT | Performed by: EMERGENCY MEDICINE

## 2021-10-28 PROCEDURE — 84484 ASSAY OF TROPONIN QUANT: CPT | Performed by: EMERGENCY MEDICINE

## 2021-10-28 PROCEDURE — 0202U NFCT DS 22 TRGT SARS-COV-2: CPT | Performed by: EMERGENCY MEDICINE

## 2021-10-28 PROCEDURE — 85025 COMPLETE CBC W/AUTO DIFF WBC: CPT | Performed by: EMERGENCY MEDICINE

## 2021-10-28 PROCEDURE — 71045 X-RAY EXAM CHEST 1 VIEW: CPT

## 2021-10-28 RX ORDER — DEXAMETHASONE 6 MG/1
6 TABLET ORAL DAILY
Qty: 5 TABLET | Refills: 0 | Status: SHIPPED | OUTPATIENT
Start: 2021-10-28

## 2021-10-28 RX ADMIN — DEXAMETHASONE 6 MG: 2 TABLET ORAL at 11:03

## 2021-10-28 RX ADMIN — IOPAMIDOL 100 ML: 612 INJECTION, SOLUTION INTRAVENOUS at 13:37

## 2022-03-23 ENCOUNTER — APPOINTMENT (OUTPATIENT)
Dept: MAMMOGRAPHY | Facility: HOSPITAL | Age: 52
End: 2022-03-23

## 2022-04-12 ENCOUNTER — OFFICE VISIT (OUTPATIENT)
Dept: NEUROLOGY | Facility: CLINIC | Age: 52
End: 2022-04-12

## 2022-04-12 VITALS
DIASTOLIC BLOOD PRESSURE: 90 MMHG | HEIGHT: 69 IN | HEART RATE: 69 BPM | TEMPERATURE: 97.7 F | WEIGHT: 293 LBS | BODY MASS INDEX: 43.4 KG/M2 | SYSTOLIC BLOOD PRESSURE: 130 MMHG | OXYGEN SATURATION: 95 %

## 2022-04-12 DIAGNOSIS — E78.5 DYSLIPIDEMIA: ICD-10-CM

## 2022-04-12 DIAGNOSIS — R06.83 SNORING: Primary | ICD-10-CM

## 2022-04-12 DIAGNOSIS — I10 ESSENTIAL HYPERTENSION: ICD-10-CM

## 2022-04-12 PROCEDURE — 99203 OFFICE O/P NEW LOW 30 MIN: CPT | Performed by: NURSE PRACTITIONER

## 2022-04-12 RX ORDER — VALACYCLOVIR HYDROCHLORIDE 1 G/1
TABLET, FILM COATED ORAL
COMMUNITY
Start: 2022-03-31

## 2022-04-12 RX ORDER — BUSPIRONE HYDROCHLORIDE 5 MG/1
TABLET ORAL
COMMUNITY
Start: 2022-03-31

## 2022-04-12 RX ORDER — VENLAFAXINE HYDROCHLORIDE 150 MG/1
CAPSULE, EXTENDED RELEASE ORAL
COMMUNITY
Start: 2022-03-24

## 2022-04-12 RX ORDER — CEPHALEXIN 500 MG/1
CAPSULE ORAL
COMMUNITY
Start: 2022-03-31

## 2022-04-12 NOTE — PROGRESS NOTES
New Sleep Patient Office Visit      Patient Name: Jacquelyn Goldberg  : 1970   MRN: [id number]     Referring Physician: Samantha Jc APRN    Chief Complaint:    Chief Complaint   Patient presents with   • Consult     NP, in office to establish care for obdulia. Patient c/o snoring, gasping for air, restless sleep.            History of Present Illness: Jacquelyn Goldberg is a 51 y.o. female who is here today to establish care with Sleep Medicine.  She is accompanied by her  today.  She says she was inpatient in  and was told she had significant OBDULIA but a test was never ordered and she was never set up on PAP therapy- she was on PAP therapy while in the hospital.  She is currently sleeping propped up on pillows because she is scared to sleep flat.  Sleep questionnaire reviewed- she snores, has exhaustion, has difficulty initiating and maintaining sleep, wakes up 2 times during sleep and has difficulty falling back to sleep, sleeps about 5 hours per night, experiences restless or disturbed sleep, feels better with more sleep, takes naps daily, has pain that interferes with sleep, wakes up with a dry mouth, has been told she stops breathing during sleep.  Additional risk factors- BMI 63, HTN, diabetes, pulmonary embolism, hx of uterine and parathyroid cancer, RA, mood disorder.    [place] Score: 11    Subjective      Review of Systems:   Review of Systems   Constitutional: Positive for fatigue. Negative for fever, unexpected weight gain and unexpected weight loss.   HENT: Negative for hearing loss, sore throat, swollen glands, tinnitus and trouble swallowing.    Eyes: Negative for blurred vision, double vision, photophobia and visual disturbance.   Respiratory: Positive for choking, chest tightness, shortness of breath and wheezing. Negative for cough.    Cardiovascular: Negative for chest pain, palpitations and leg swelling.   Gastrointestinal: Negative for constipation, diarrhea and nausea.    Endocrine: Negative for cold intolerance and heat intolerance.   Musculoskeletal: Negative for gait problem, neck pain and neck stiffness.   Skin: Negative for color change and rash.   Allergic/Immunologic: Negative for environmental allergies and food allergies.   Neurological: Negative for dizziness, syncope, facial asymmetry, speech difficulty, weakness, headache, memory problem and confusion.   Psychiatric/Behavioral: Positive for sleep disturbance. Negative for agitation, behavioral problems and depressed mood. The patient is not nervous/anxious.        Past Medical History:   Past Medical History:   Diagnosis Date   • Anxiety    • Depression    • Diabetes (HCC)    • DVT (deep vein thrombosis) in pregnancy    • HTN (hypertension)    • Parathyroid cancer (HCC)    • Stroke (HCC)    • Uterine cancer (HCC)        Past Surgical History:   Past Surgical History:   Procedure Laterality Date   •  SECTION     • HYSTERECTOMY     • PARATHYROIDECTOMY         Family History:   Family History   Problem Relation Age of Onset   • Cancer Mother         breast   • Diabetes Father    • Hypertension Father    • Stroke Father    • Sleep apnea Father    • Diabetes Brother    • Hypertension Brother        Social History:   Social History     Socioeconomic History   • Marital status:    Tobacco Use   • Smoking status: Current Some Day Smoker   • Smokeless tobacco: Never Used   Vaping Use   • Vaping Use: Never used   Substance and Sexual Activity   • Alcohol use: Yes     Comment: occ.   • Drug use: No   • Sexual activity: Defer       Medications:     Current Outpatient Medications:   •  ARIPiprazole (ABILIFY) 5 MG tablet, , Disp: , Rfl:   •  busPIRone (BUSPAR) 5 MG tablet, , Disp: , Rfl:   •  cephalexin (KEFLEX) 500 MG capsule, , Disp: , Rfl:   •  dexamethasone (DECADRON) 6 MG tablet, Take 1 tablet by mouth Daily., Disp: 5 tablet, Rfl: 0  •  ELIQUIS 2.5 MG tablet tablet, TAKE ONE TABLET BY MOUTH 2 TIMES A DAY  "SUBSEQUENT REFILLS WILL BE FILLED BY PCP, Disp: 60 tablet, Rfl: 0  •  fluticasone (FLONASE) 50 MCG/ACT nasal spray, 2 sprays into each nostril Daily., Disp: , Rfl:   •  hydroCHLOROthiazide (MICROZIDE) 12.5 MG capsule, Take  by mouth Daily., Disp: , Rfl:   •  lisinopril (PRINIVIL,ZESTRIL) 10 MG tablet, , Disp: , Rfl:   •  metFORMIN (GLUCOPHAGE) 500 MG tablet, , Disp: , Rfl:   •  methotrexate 2.5 MG tablet, , Disp: , Rfl:   •  ProAir  (90 Base) MCG/ACT inhaler, , Disp: , Rfl:   •  valACYclovir (VALTREX) 1000 MG tablet, , Disp: , Rfl:   •  venlafaxine XR (EFFEXOR-XR) 150 MG 24 hr capsule, , Disp: , Rfl:     Allergies:   Allergies   Allergen Reactions   • Latex Rash       Objective     Physical Exam:  Vital Signs:   Vitals:    04/12/22 1351   BP: 130/90   BP Location: Right arm   Patient Position: Sitting   Cuff Size: Adult   Pulse: 69   Temp: 97.7 °F (36.5 °C)   SpO2: 95%   Weight: (!) 196 kg (433 lb)   Height: 175.3 cm (69\")   PainSc:   7   PainLoc: Generalized     BMI: Body mass index is 63.94 kg/m².  Neck Circumference: 18 1/8    Physical Exam  Vitals and nursing note reviewed.   Constitutional:       General: She is not in acute distress.     Appearance: She is well-developed. She is obese. She is not diaphoretic.   HENT:      Head: Normocephalic and atraumatic.      Comments: Mallampati 4 with enlarged neck circumference  Eyes:      Extraocular Movements: Extraocular movements intact.      Conjunctiva/sclera: Conjunctivae normal.      Pupils: Pupils are equal, round, and reactive to light.   Neck:      Trachea: Trachea normal.   Cardiovascular:      Rate and Rhythm: Normal rate and regular rhythm.      Heart sounds: Normal heart sounds. No murmur heard.    No friction rub. No gallop.   Pulmonary:      Effort: Pulmonary effort is normal. No respiratory distress.      Breath sounds: Normal breath sounds. No wheezing or rales.   Musculoskeletal:         General: Normal range of motion.   Skin:     General: " Skin is warm and dry.      Findings: No rash.   Neurological:      Mental Status: She is alert and oriented to person, place, and time.   Psychiatric:         Mood and Affect: Mood normal.         Behavior: Behavior normal.         Thought Content: Thought content normal.         Judgment: Judgment normal.         Assessment / Plan      Assessment/Plan:   Diagnoses and all orders for this visit:    1. Snoring (Primary)  -     Polysomnography 4 or More Parameters With CPAP; Future  -     COVID PRE-OP / PRE-PROCEDURE SCREENING ORDER (NO ISOLATION) - Swab, Nasopharynx; Future    2. Essential hypertension  -     Polysomnography 4 or More Parameters With CPAP; Future  -     COVID PRE-OP / PRE-PROCEDURE SCREENING ORDER (NO ISOLATION) - Swab, Nasopharynx; Future    3. Dyslipidemia  -     Polysomnography 4 or More Parameters With CPAP; Future  -     COVID PRE-OP / PRE-PROCEDURE SCREENING ORDER (NO ISOLATION) - Swab, Nasopharynx; Future    4. BMI 60.0-69.9, adult (HCC)  -     Polysomnography 4 or More Parameters With CPAP; Future  -     COVID PRE-OP / PRE-PROCEDURE SCREENING ORDER (NO ISOLATION) - Swab, Nasopharynx; Future    *Patient is agreeable to a PSG/ split night study.   *Advised patient to avoid driving if drowsy.   *Education on DAVID provided today.   *Encouraged weight loss with a BMI goal of 24.     Follow Up:   Return in about 4 months (around 8/12/2022) for F/U Obstructive Sleep Apnea.    I have advised the patient the need to continue the use of CPAP.  Gold standard for treatment of sleep apnea includes weight loss, use of cpap and avoidance of alcohol.  Untreated DAVID may increase the risk for development of hypertension, stroke, myocardial infarction, diabetes, cardiovascular disease, work-related issues and driving accidents. I have counseled and advised the patient to avoid driving or operating heavy/dangerous equipment if feeling drowsy.     ENZO Simpson, FNP-C  [facility] Neurology  and Sleep Medicine       Please note that portions of this note may have been completed with a voice recognition program. Efforts were made to edit the dictations, but occasionally words are mistranscribed.

## 2022-08-03 ENCOUNTER — TELEPHONE (OUTPATIENT)
Dept: NEUROLOGY | Facility: CLINIC | Age: 52
End: 2022-08-03

## 2022-08-03 NOTE — TELEPHONE ENCOUNTER
Attempted to contact patient to let her know we need to cancel her appointment for next week because she has not completed her PSG.    No answer and no voicemail when calling patient.    Type Of Destruction Used: Curettage Consent: Written consent was obtained and risks were reviewed including but not limited to scarring, infection, bleeding, scabbing, incomplete removal, nerve damage and allergy to anesthesia. Bill [id number] For Specimen Handling/Conveyance To Laboratory?: no Anesthesia Volume In Cc (Will Not Render If 0): 0.5 Silver Nitrate Text: The wound bed was treated with silver nitrate after the biopsy was performed. Hemostasis: Reece's Additional Anesthesia Volume In Cc (Will Not Render If 0): 0 Post-Care Instructions: I reviewed with the patient in detail post-care instructions. Patient is to keep the biopsy site dry overnight, and then apply Vaseline twice daily and keep covered with band-aid until healed. Notification Instructions: Patient will be notified of biopsy results. However, patient instructed to call the office if not contacted within 2-3 weeks. Electrodesiccation Text: The wound bed was treated with electrodesiccation after the biopsy was performed. Anesthesia Type: 1% lidocaine with epinephrine Detail Level: Detailed Billing Type: Third-Party Bill Was A Bandage Applied: Yes Wound Care: Bacitracin Biopsy Type: H and E Curettage Text: The wound bed was treated with curettage after the biopsy was performed. Biopsy Method: Personna blade Depth Of Biopsy: dermis Electrodesiccation And Curettage Text: The wound bed was treated with electrodesiccation and curettage after the biopsy was performed. Cryotherapy Text: The wound bed was treated with cryotherapy after the biopsy was performed. Size Of Lesion In Cm: 0.8 Dressing: bandage

## 2022-09-26 ENCOUNTER — TRANSCRIBE ORDERS (OUTPATIENT)
Dept: ADMINISTRATIVE | Facility: HOSPITAL | Age: 52
End: 2022-09-26

## 2022-09-26 DIAGNOSIS — Z12.39 BREAST SCREENING: Primary | ICD-10-CM
